# Patient Record
Sex: FEMALE | URBAN - METROPOLITAN AREA
[De-identification: names, ages, dates, MRNs, and addresses within clinical notes are randomized per-mention and may not be internally consistent; named-entity substitution may affect disease eponyms.]

---

## 2020-09-14 ENCOUNTER — OFFICE VISIT (OUTPATIENT)
Dept: URBAN - METROPOLITAN AREA CLINIC 33 | Facility: CLINIC | Age: 65
End: 2020-09-14
Payer: MEDICARE

## 2020-09-14 DIAGNOSIS — H25.13 AGE-RELATED NUCLEAR CATARACT, BILATERAL: ICD-10-CM

## 2020-09-14 PROCEDURE — 92004 COMPRE OPH EXAM NEW PT 1/>: CPT | Performed by: OPTOMETRIST

## 2020-09-14 ASSESSMENT — VISUAL ACUITY
OD: 20/30
OS: 20/40

## 2020-09-14 ASSESSMENT — INTRAOCULAR PRESSURE
OD: 21
OS: 21

## 2020-09-14 ASSESSMENT — KERATOMETRY
OD: 44.13
OS: 43.75

## 2020-09-14 NOTE — IMPRESSION/PLAN
Impression: Type 2 diabetes mellitus w/o complication: Y05.2. Plan: Diabetes w/o Complications: No signs of diabetic retinopathy noted. No treatment necessary at this time. Patient was instructed to monitor vision for sudden changes and to call if visual changes noted. Discussed ocular and systemic benefits of blood sugar control. Continue management with PCP. Letter sent to PCP regarding status of ocular health.

## 2020-09-14 NOTE — IMPRESSION/PLAN
Impression: Age-related nuclear cataract, bilateral: H25.13. Plan: Cataracts account for the patient's complaints. Discussed all risks, benefits, procedures and recovery. Patient understands changing glasses will not improve vision. Patient desires to have surgery, recommend phacoemulsification with intraocular lens. Recommend CE OS then OD, Standard IOL, aim plano Discussed need for glasses after surgery If desired, patient is a candidate MF IOL. Discussed glasses vs. cataract surgery.

## 2020-10-06 ENCOUNTER — OFFICE VISIT (OUTPATIENT)
Dept: URBAN - METROPOLITAN AREA CLINIC 33 | Facility: CLINIC | Age: 65
End: 2020-10-06
Payer: MEDICARE

## 2020-10-06 DIAGNOSIS — H52.13 MYOPIA, BILATERAL: ICD-10-CM

## 2020-10-06 DIAGNOSIS — E11.9 TYPE 2 DIABETES MELLITUS W/O COMPLICATION: ICD-10-CM

## 2020-10-06 DIAGNOSIS — H35.361 DRUSEN (DEGENERATIVE) OF MACULA, RIGHT EYE: ICD-10-CM

## 2020-10-06 PROCEDURE — 92134 CPTRZ OPH DX IMG PST SGM RTA: CPT | Performed by: OPHTHALMOLOGY

## 2020-10-06 PROCEDURE — 92004 COMPRE OPH EXAM NEW PT 1/>: CPT | Performed by: OPHTHALMOLOGY

## 2020-10-06 ASSESSMENT — INTRAOCULAR PRESSURE
OS: 17
OD: 16

## 2020-10-06 NOTE — IMPRESSION/PLAN
Impression: Type 2 diabetes mellitus w/o complication: U46.3. Plan: no background retinopathy, no signs of neovascularization noted. Discussed ocular and systemic benefits of blood sugar control. Advised patient the importance of good BS control. RTC 1 year DFE PRN.

## 2020-10-06 NOTE — IMPRESSION/PLAN
Impression: Age-related nuclear cataract, bilateral: H25.13. Plan: Cataracts account for some decreased vision, however, the patient is aware with macular changes that level of vision post operatively cannot be determined. Cataracts are limiting vision. Discussed risks, benefits and alternatives to surgery including but not limited to: bleeding, infection, risk of vision loss, loss of the eye, need for other surgery. Patient voiced understanding and wishes to proceed. Patient desires surgery, recommend phacoemulsification with intraocular lens. If Durezol/Vigamox not covered by insurance, okay to switch to generic. **patient not a candidate for mf lens due to drusen od. Patient needs to return for ascan RIGHT EYE THEN LEFT EYE
RL2 STANDARD LENS
AIM: -2.00 patient likes taking glasses to read. DILAN: if desires ORA: if desires USE ILEVRO since she has retina changes od Pt understands will need glasses for BCVA after Sx.

## 2020-10-06 NOTE — IMPRESSION/PLAN
Impression: Myopia, bilateral: H52.13. Plan: Discussed rt/rd precaution. Advised patient to call us if patient has onset of new floaters and flashes of light. Monitor. Patient to call us sooner PRN onset of new visual changes.

## 2020-10-06 NOTE — IMPRESSION/PLAN
Impression: Drusen (degenerative) of macula, right eye: H35.361. Plan: Macular degeneration, dry type with stable vision. No fluid or heme noted. Will continue to monitor vision and the patient has been instructed to call with any vision changes. Amsler grid was given today. Advised patient it would be best to start AREDS. Patient is a non smoker. Advised patient to eat green leafy vegetables.